# Patient Record
Sex: FEMALE | Race: ASIAN | NOT HISPANIC OR LATINO
[De-identification: names, ages, dates, MRNs, and addresses within clinical notes are randomized per-mention and may not be internally consistent; named-entity substitution may affect disease eponyms.]

---

## 2021-06-17 ENCOUNTER — APPOINTMENT (OUTPATIENT)
Dept: GASTROENTEROLOGY | Facility: CLINIC | Age: 66
End: 2021-06-17
Payer: MEDICARE

## 2021-06-17 VITALS
DIASTOLIC BLOOD PRESSURE: 84 MMHG | TEMPERATURE: 97.6 F | HEIGHT: 59 IN | BODY MASS INDEX: 25.2 KG/M2 | WEIGHT: 125 LBS | SYSTOLIC BLOOD PRESSURE: 132 MMHG

## 2021-06-17 DIAGNOSIS — Z78.9 OTHER SPECIFIED HEALTH STATUS: ICD-10-CM

## 2021-06-17 DIAGNOSIS — D50.0 IRON DEFICIENCY ANEMIA SECONDARY TO BLOOD LOSS (CHRONIC): ICD-10-CM

## 2021-06-17 PROBLEM — Z00.00 ENCOUNTER FOR PREVENTIVE HEALTH EXAMINATION: Status: ACTIVE | Noted: 2021-06-17

## 2021-06-17 PROCEDURE — 99213 OFFICE O/P EST LOW 20 MIN: CPT

## 2021-06-30 PROBLEM — D50.0 IRON DEFICIENCY ANEMIA DUE TO CHRONIC BLOOD LOSS: Status: ACTIVE | Noted: 2021-06-30

## 2021-06-30 PROBLEM — Z78.9 NON-SMOKER: Status: ACTIVE | Noted: 2021-06-30

## 2021-06-30 NOTE — HISTORY OF PRESENT ILLNESS
[FreeTextEntry1] : Colonoscopy and endoscopy  was negative, Capsule endoscopy was also negative for small bowel pathology.\par She is feeling well and has no complaints.

## 2021-06-30 NOTE — PHYSICAL EXAM
[General Appearance - Alert] : alert [General Appearance - In No Acute Distress] : in no acute distress [Sclera] : the sclera and conjunctiva were normal [PERRL With Normal Accommodation] : pupils were equal in size, round, and reactive to light [Extraocular Movements] : extraocular movements were intact [Oropharynx] : the oropharynx was normal [Outer Ear] : the ears and nose were normal in appearance [Neck Appearance] : the appearance of the neck was normal [Neck Cervical Mass (___cm)] : no neck mass was observed [Jugular Venous Distention Increased] : there was no jugular-venous distention [Thyroid Diffuse Enlargement] : the thyroid was not enlarged [Thyroid Nodule] : there were no palpable thyroid nodules [Auscultation Breath Sounds / Voice Sounds] : lungs were clear to auscultation bilaterally [Heart Rate And Rhythm] : heart rate was normal and rhythm regular [Heart Sounds] : normal S1 and S2 [Heart Sounds Gallop] : no gallops [Murmurs] : no murmurs [Heart Sounds Pericardial Friction Rub] : no pericardial rub [Bowel Sounds] : normal bowel sounds [Abdomen Soft] : soft [Abdomen Tenderness] : non-tender [Abdomen Mass (___ Cm)] : no abdominal mass palpated [Urethral Meatus] : normal urethra [Urinary Bladder Findings] : the bladder was normal on palpation [No CVA Tenderness] : no ~M costovertebral angle tenderness [No Spinal Tenderness] : no spinal tenderness [Nail Clubbing] : no clubbing  or cyanosis of the fingernails [Abnormal Walk] : normal gait [Musculoskeletal - Swelling] : no joint swelling seen [Motor Tone] : muscle strength and tone were normal [Skin Color & Pigmentation] : normal skin color and pigmentation [Skin Turgor] : normal skin turgor [] : no rash [Deep Tendon Reflexes (DTR)] : deep tendon reflexes were 2+ and symmetric [Sensation] : the sensory exam was normal to light touch and pinprick [No Focal Deficits] : no focal deficits [Oriented To Time, Place, And Person] : oriented to person, place, and time [Impaired Insight] : insight and judgment were intact [Affect] : the affect was normal

## 2021-06-30 NOTE — CONSULT LETTER
[Dear  ___] : Dear  [unfilled], [Consult Letter:] : I had the pleasure of evaluating your patient, [unfilled]. [( Thank you for referring [unfilled] for consultation for _____ )] : Thank you for referring [unfilled] for consultation for [unfilled] [Please see my note below.] : Please see my note below. [Consult Closing:] : Thank you very much for allowing me to participate in the care of this patient.  If you have any questions, please do not hesitate to contact me. [Sincerely,] : Sincerely, [FreeTextEntry3] : Tunde Burch MD\par

## 2022-05-05 ENCOUNTER — APPOINTMENT (OUTPATIENT)
Dept: PULMONOLOGY | Facility: CLINIC | Age: 67
End: 2022-05-05
Payer: MEDICARE

## 2022-05-05 VITALS
BODY MASS INDEX: 25.8 KG/M2 | OXYGEN SATURATION: 99 % | DIASTOLIC BLOOD PRESSURE: 75 MMHG | WEIGHT: 128 LBS | SYSTOLIC BLOOD PRESSURE: 118 MMHG | HEART RATE: 71 BPM | TEMPERATURE: 97.7 F | HEIGHT: 59 IN

## 2022-05-05 DIAGNOSIS — M62.838 OTHER MUSCLE SPASM: ICD-10-CM

## 2022-05-05 DIAGNOSIS — Z87.39 PERSONAL HISTORY OF OTHER DISEASES OF THE MUSCULOSKELETAL SYSTEM AND CONNECTIVE TISSUE: ICD-10-CM

## 2022-05-05 DIAGNOSIS — Z86.39 PERSONAL HISTORY OF OTHER ENDOCRINE, NUTRITIONAL AND METABOLIC DISEASE: ICD-10-CM

## 2022-05-05 PROCEDURE — 99203 OFFICE O/P NEW LOW 30 MIN: CPT | Mod: 25

## 2022-05-05 PROCEDURE — 94060 EVALUATION OF WHEEZING: CPT

## 2022-05-05 PROCEDURE — 94727 GAS DIL/WSHOT DETER LNG VOL: CPT

## 2022-05-05 PROCEDURE — 94729 DIFFUSING CAPACITY: CPT

## 2022-05-05 RX ORDER — LIFITEGRAST 50 MG/ML
5 SOLUTION/ DROPS OPHTHALMIC
Refills: 0 | Status: ACTIVE | COMMUNITY

## 2022-05-05 RX ORDER — ROMOSOZUMAB-AQQG 105 MG/1.17ML
105 INJECTION, SOLUTION SUBCUTANEOUS
Refills: 0 | Status: ACTIVE | COMMUNITY

## 2022-05-05 RX ORDER — LATANOPROST/PF 0.005 %
0.01 DROPS OPHTHALMIC (EYE)
Refills: 0 | Status: ACTIVE | COMMUNITY

## 2022-05-05 RX ORDER — CHROMIUM 200 MCG
TABLET ORAL
Refills: 0 | Status: ACTIVE | COMMUNITY

## 2022-05-05 NOTE — HISTORY OF PRESENT ILLNESS
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  It has been progressively getting worse.  Patient is a non-smoker.  She has no pets at home.\par Cough is generally dry.  She is unable to sing because of cough.  When she coughs a lot she gets short of breath.  Short of breath upon climbing a steep incline.  She can climb 1 flight of stairs.\par Patient sleeps for 8 hours at night.  Just before sleeping she drinks tea.  She wakes up refreshed.  She is active.  She has maintained her weight.  She uses strong cleaning agents to clean her house.\par Her medical history is significant for history of osteoporosis.  She has iron deficiency anemia which was recently diagnosed.

## 2022-05-05 NOTE — DISCUSSION/SUMMARY
[FreeTextEntry1] : The patient was advised to avoid using strong cleaning agents.  She was advised not to drink water before bedtime.\par She will be given Symbicort inhaler 2 puffs twice a day.

## 2022-05-09 RX ORDER — BUDESONIDE AND FORMOTEROL FUMARATE DIHYDRATE 80; 4.5 UG/1; UG/1
80-4.5 AEROSOL RESPIRATORY (INHALATION) TWICE DAILY
Qty: 3 | Refills: 2 | Status: DISCONTINUED | COMMUNITY
Start: 2022-05-05 | End: 2022-05-09

## 2022-08-18 ENCOUNTER — APPOINTMENT (OUTPATIENT)
Dept: PULMONOLOGY | Facility: CLINIC | Age: 67
End: 2022-08-18

## 2022-08-18 VITALS
OXYGEN SATURATION: 100 % | BODY MASS INDEX: 25.2 KG/M2 | DIASTOLIC BLOOD PRESSURE: 73 MMHG | WEIGHT: 125 LBS | HEART RATE: 65 BPM | HEIGHT: 59 IN | SYSTOLIC BLOOD PRESSURE: 128 MMHG

## 2022-08-18 PROCEDURE — 99213 OFFICE O/P EST LOW 20 MIN: CPT

## 2022-08-18 RX ORDER — CYCLOBENZAPRINE HYDROCHLORIDE 7.5 MG/1
7.5 TABLET, FILM COATED ORAL
Qty: 90 | Refills: 0 | Status: DISCONTINUED | COMMUNITY
Start: 2022-05-05 | End: 2022-08-18

## 2022-08-18 RX ORDER — BUDESONIDE AND FORMOTEROL FUMARATE DIHYDRATE 80; 4.5 UG/1; UG/1
80-4.5 AEROSOL RESPIRATORY (INHALATION)
Qty: 3 | Refills: 0 | Status: ACTIVE | COMMUNITY
Start: 1900-01-01 | End: 1900-01-01

## 2022-08-18 NOTE — HISTORY OF PRESENT ILLNESS
[Never] : never [TextBox_4] : Patient with history of cough and shortness of breath returns for follow-up.  She has been feeling well since the last visit.  She took inhaled steroids for 1 month.  Her symptoms completely resolved.  She has stopped taking it.  She is active.  She has severe soreness of throat for the last 2 days.  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  It has been progressively getting worse.  Patient is a non-smoker.  She has no pets at home.\par Cough is generally dry.  She is unable to sing because of cough.  When she coughs a lot she gets short of breath.  Short of breath upon climbing a steep incline.  She can climb 1 flight of stairs.\par Patient sleeps for 8 hours at night.  Just before sleeping she drinks tea.  She wakes up refreshed.  She is active.  She has maintained her weight.  She uses strong cleaning agents to clean her house.\par Her medical history is significant for history of osteoporosis.  She has iron deficiency anemia which was recently diagnosed.

## 2023-03-14 ENCOUNTER — APPOINTMENT (OUTPATIENT)
Dept: PULMONOLOGY | Facility: CLINIC | Age: 68
End: 2023-03-14

## 2023-03-31 ENCOUNTER — APPOINTMENT (OUTPATIENT)
Dept: PULMONOLOGY | Facility: CLINIC | Age: 68
End: 2023-03-31
Payer: MEDICARE

## 2023-03-31 VITALS
TEMPERATURE: 97.7 F | BODY MASS INDEX: 24.44 KG/M2 | WEIGHT: 121 LBS | DIASTOLIC BLOOD PRESSURE: 80 MMHG | HEART RATE: 73 BPM | OXYGEN SATURATION: 99 % | SYSTOLIC BLOOD PRESSURE: 132 MMHG

## 2023-03-31 DIAGNOSIS — R05.9 COUGH, UNSPECIFIED: ICD-10-CM

## 2023-03-31 DIAGNOSIS — J45.909 UNSPECIFIED ASTHMA, UNCOMPLICATED: ICD-10-CM

## 2023-03-31 PROCEDURE — 94729 DIFFUSING CAPACITY: CPT

## 2023-03-31 PROCEDURE — 99203 OFFICE O/P NEW LOW 30 MIN: CPT | Mod: 25

## 2023-03-31 PROCEDURE — 94060 EVALUATION OF WHEEZING: CPT

## 2023-03-31 PROCEDURE — 94727 GAS DIL/WSHOT DETER LNG VOL: CPT

## 2023-03-31 RX ORDER — ALBUTEROL SULFATE 90 UG/1
108 (90 BASE) INHALANT RESPIRATORY (INHALATION)
Qty: 1 | Refills: 3 | Status: ACTIVE | COMMUNITY
Start: 2023-03-31 | End: 1900-01-01

## 2023-06-11 PROBLEM — R05.9 COUGH: Status: ACTIVE | Noted: 2022-05-05

## 2023-06-11 PROBLEM — J45.909 ASTHMA: Status: ACTIVE | Noted: 2022-05-05

## 2023-06-11 NOTE — REVIEW OF SYSTEMS
[Cough] : no cough [Sputum] : no sputum [Dyspnea] : dyspnea [Wheezing] : wheezing [Negative] : Endocrine

## 2023-06-11 NOTE — DISCUSSION/SUMMARY
[FreeTextEntry1] : 67-year-old female with history of "mild asthma" at baseline.  I reviewed the PFT results with the patient.  At present she is to use albuterol metered-dose inhaler alone for now.  She is to follow-up with her PMD as before.

## 2023-06-11 NOTE — HISTORY OF PRESENT ILLNESS
[Never] : never [TextBox_4] : 68 yo female with hx of "mild asthma" presents for evaluation. The patient has never been admitted for respiratory problems. She complains of PRN wheezing and SOB without cough or chest pain. She uses symbicort 80/4.5 PRN alone. [TextBox_29] : Denies snoring, daytime somnolence, apneic episodes, AM headaches

## 2023-08-22 NOTE — DISCUSSION/SUMMARY
[FreeTextEntry1] : She was advised to use Symbicort if she needs.  She will return for follow-up in 6 months. 1

## 2023-12-14 ENCOUNTER — APPOINTMENT (OUTPATIENT)
Dept: GASTROENTEROLOGY | Facility: CLINIC | Age: 68
End: 2023-12-14

## 2024-04-18 ENCOUNTER — APPOINTMENT (OUTPATIENT)
Dept: PULMONOLOGY | Facility: CLINIC | Age: 69
End: 2024-04-18
Payer: MEDICARE

## 2024-04-18 VITALS
WEIGHT: 121 LBS | HEART RATE: 82 BPM | OXYGEN SATURATION: 99 % | BODY MASS INDEX: 24.44 KG/M2 | SYSTOLIC BLOOD PRESSURE: 128 MMHG | DIASTOLIC BLOOD PRESSURE: 76 MMHG | TEMPERATURE: 96.8 F

## 2024-04-18 PROCEDURE — 94727 GAS DIL/WSHOT DETER LNG VOL: CPT

## 2024-04-18 PROCEDURE — 94060 EVALUATION OF WHEEZING: CPT

## 2024-04-18 PROCEDURE — 99214 OFFICE O/P EST MOD 30 MIN: CPT | Mod: 25

## 2024-04-18 PROCEDURE — 94729 DIFFUSING CAPACITY: CPT

## 2024-04-28 NOTE — DISCUSSION/SUMMARY
[FreeTextEntry1] : 68-year-old female with history of "asthma" with related complaints.  I reviewed the PFT results with the patient.  Despite normal results, given her complaints, she is to use Symbicort twice daily with albuterol as needed.  Treatment adjustment will depend on symptomatic needs.  She is to follow-up with her PMD as before.

## 2024-04-28 NOTE — HISTORY OF PRESENT ILLNESS
[Never] : never [TextBox_4] : 68-year-old female with history of "asthma" presents for follow-up.  Patient complains of increasing dyspnea on exertion over the last few months without cough, chest pain, hemoptysis, night sweats or weight loss.  She continues to use both albuterol and Symbicort on an as-needed basis alone. [TextBox_29] : Denies snoring, daytime somnolence, apneic episodes, AM headaches